# Patient Record
Sex: FEMALE | Race: WHITE | NOT HISPANIC OR LATINO | URBAN - METROPOLITAN AREA
[De-identification: names, ages, dates, MRNs, and addresses within clinical notes are randomized per-mention and may not be internally consistent; named-entity substitution may affect disease eponyms.]

---

## 2022-03-31 ENCOUNTER — OFFICE VISIT (OUTPATIENT)
Dept: UROLOGY | Facility: CLINIC | Age: 62
End: 2022-03-31
Payer: COMMERCIAL

## 2022-03-31 VITALS
SYSTOLIC BLOOD PRESSURE: 131 MMHG | WEIGHT: 158 LBS | HEIGHT: 65 IN | HEART RATE: 91 BPM | DIASTOLIC BLOOD PRESSURE: 80 MMHG | BODY MASS INDEX: 26.33 KG/M2 | TEMPERATURE: 98 F

## 2022-03-31 DIAGNOSIS — N81.10 FEMALE BLADDER PROLAPSE: Primary | ICD-10-CM

## 2022-03-31 PROCEDURE — 1160F PR REVIEW ALL MEDS BY PRESCRIBER/CLIN PHARMACIST DOCUMENTED: ICD-10-PCS | Mod: CPTII,S$GLB,, | Performed by: UROLOGY

## 2022-03-31 PROCEDURE — 3079F PR MOST RECENT DIASTOLIC BLOOD PRESSURE 80-89 MM HG: ICD-10-PCS | Mod: CPTII,S$GLB,, | Performed by: UROLOGY

## 2022-03-31 PROCEDURE — 1160F RVW MEDS BY RX/DR IN RCRD: CPT | Mod: CPTII,S$GLB,, | Performed by: UROLOGY

## 2022-03-31 PROCEDURE — 99205 PR OFFICE/OUTPT VISIT, NEW, LEVL V, 60-74 MIN: ICD-10-PCS | Mod: S$GLB,,, | Performed by: UROLOGY

## 2022-03-31 PROCEDURE — 3079F DIAST BP 80-89 MM HG: CPT | Mod: CPTII,S$GLB,, | Performed by: UROLOGY

## 2022-03-31 PROCEDURE — 3075F SYST BP GE 130 - 139MM HG: CPT | Mod: CPTII,S$GLB,, | Performed by: UROLOGY

## 2022-03-31 PROCEDURE — 1159F MED LIST DOCD IN RCRD: CPT | Mod: CPTII,S$GLB,, | Performed by: UROLOGY

## 2022-03-31 PROCEDURE — 3008F BODY MASS INDEX DOCD: CPT | Mod: CPTII,S$GLB,, | Performed by: UROLOGY

## 2022-03-31 PROCEDURE — 3075F PR MOST RECENT SYSTOLIC BLOOD PRESS GE 130-139MM HG: ICD-10-PCS | Mod: CPTII,S$GLB,, | Performed by: UROLOGY

## 2022-03-31 PROCEDURE — 99205 OFFICE O/P NEW HI 60 MIN: CPT | Mod: S$GLB,,, | Performed by: UROLOGY

## 2022-03-31 PROCEDURE — 3008F PR BODY MASS INDEX (BMI) DOCUMENTED: ICD-10-PCS | Mod: CPTII,S$GLB,, | Performed by: UROLOGY

## 2022-03-31 PROCEDURE — 1159F PR MEDICATION LIST DOCUMENTED IN MEDICAL RECORD: ICD-10-PCS | Mod: CPTII,S$GLB,, | Performed by: UROLOGY

## 2022-03-31 RX ORDER — PHENTERMINE HYDROCHLORIDE 37.5 MG/1
TABLET ORAL
COMMUNITY
Start: 2022-03-07 | End: 2023-11-21

## 2022-03-31 RX ORDER — LEVOTHYROXINE SODIUM 50 UG/1
50 TABLET ORAL DAILY
COMMUNITY
Start: 2022-02-11

## 2022-03-31 RX ORDER — ESOMEPRAZOLE MAGNESIUM 40 MG/1
40 CAPSULE, DELAYED RELEASE ORAL EVERY MORNING
COMMUNITY
Start: 2022-02-11

## 2022-03-31 RX ORDER — TRAZODONE HYDROCHLORIDE 50 MG/1
50 TABLET ORAL DAILY
COMMUNITY
Start: 2022-02-11 | End: 2023-11-21

## 2022-03-31 RX ORDER — DICLOFENAC SODIUM 30 MG/G
GEL TOPICAL
COMMUNITY
Start: 2022-03-09 | End: 2023-11-21

## 2022-03-31 RX ORDER — BUPROPION HYDROCHLORIDE 150 MG/1
150 TABLET, EXTENDED RELEASE ORAL 2 TIMES DAILY
COMMUNITY
Start: 2022-02-11 | End: 2023-11-21

## 2022-03-31 RX ORDER — ESTRADIOL 1 MG/1
1 TABLET ORAL DAILY
COMMUNITY
Start: 2022-03-24

## 2022-03-31 RX ORDER — FESOTERODINE FUMARATE 4 MG/1
4 TABLET, FILM COATED, EXTENDED RELEASE ORAL DAILY
COMMUNITY
Start: 2022-02-11 | End: 2023-11-21 | Stop reason: CLARIF

## 2022-03-31 RX ORDER — LINACLOTIDE 290 UG/1
CAPSULE, GELATIN COATED ORAL
COMMUNITY
Start: 2022-03-14

## 2022-03-31 RX ORDER — FENOFIBRATE 145 MG/1
145 TABLET, FILM COATED ORAL DAILY
COMMUNITY
Start: 2022-03-02

## 2022-03-31 RX ORDER — MELOXICAM 15 MG/1
15 TABLET ORAL DAILY
COMMUNITY
Start: 2022-03-09

## 2022-03-31 RX ORDER — PRAMIPEXOLE DIHYDROCHLORIDE 0.5 MG/1
0.5 TABLET ORAL DAILY
COMMUNITY
Start: 2022-03-02 | End: 2023-11-21

## 2022-03-31 RX ORDER — MONTELUKAST SODIUM 10 MG/1
10 TABLET ORAL DAILY
COMMUNITY
Start: 2022-02-11

## 2022-03-31 NOTE — PROGRESS NOTES
Subjective:       Patient ID: Sandra Rolle is a 62 y.o. female.    Chief Complaint: Bladder Prolapse      HPI:  62-year-old female with classic history for pelvic organ prolapse she has had a hysterectomy, no abdominal operations in the past she does not complain of much stress urinary incontinence.  What she does complain of an obvious bulge protruding from her vagina with standing.    Past Medical History: History reviewed. No pertinent past medical history.    Past Surgical Historical: History reviewed. No pertinent surgical history.     Medications:   Medication List with Changes/Refills   Current Medications    BUPROPION (WELLBUTRIN SR) 150 MG TBSR 12 HR TABLET    Take 150 mg by mouth 2 (two) times daily.    DICLOFENAC SODIUM (SOLARAZE) 3 % GEL    APPLY A SMALL AMOUNT TO AFFECTED AREA FOUR TIMES A DAY    ESOMEPRAZOLE (NEXIUM) 40 MG CAPSULE    Take 40 mg by mouth every morning.    ESTRADIOL (ESTRACE) 1 MG TABLET    Take 1 mg by mouth once daily.    FENOFIBRATE (TRICOR) 145 MG TABLET    Take 145 mg by mouth once daily.    LEVOTHYROXINE (SYNTHROID) 50 MCG TABLET    Take 50 mcg by mouth once daily.    LINZESS 290 MCG CAP CAPSULE    TAKE ONE CAPSULE BY MOUTH DAILY ON EMPTY STOMACH. EAT 1 HR LATER    MELOXICAM (MOBIC) 15 MG TABLET    Take 15 mg by mouth once daily.    MONTELUKAST (SINGULAIR) 10 MG TABLET    Take 10 mg by mouth once daily.    PHENTERMINE (ADIPEX-P) 37.5 MG TABLET    TAKE 1 TABLET BY MOUTH EVERY MORNING BEFORE BREAKFAST FOR 60 DAYS    PRAMIPEXOLE (MIRAPEX) 0.5 MG TABLET    Take 0.5 mg by mouth once daily.    TOVIAZ 4 MG TB24    Take 4 mg by mouth once daily.    TRAZODONE (DESYREL) 50 MG TABLET    Take 50 mg by mouth once daily.        Past Social History:   Social History     Socioeconomic History    Marital status: Unknown   Tobacco Use    Smoking status: Never Smoker    Smokeless tobacco: Never Used   Substance and Sexual Activity    Alcohol use: Not Currently    Drug use: Never    Sexual  activity: Yes       Allergies: Review of patient's allergies indicates:  No Known Allergies     Family History: History reviewed. No pertinent family history.     Review of Systems:  Review of Systems   Constitutional: Negative for activity change and appetite change.   HENT: Negative for congestion and dental problem.    Respiratory: Negative for chest tightness and shortness of breath.    Cardiovascular: Negative for chest pain.   Gastrointestinal: Negative for abdominal distention and abdominal pain.   Genitourinary: Negative for decreased urine volume, difficulty urinating, dyspareunia, dysuria, enuresis, flank pain, frequency, genital sores, hematuria, pelvic pain and urgency.   Musculoskeletal: Negative for back pain and neck pain.   Allergic/Immunologic: Negative for immunocompromised state.   Neurological: Negative for dizziness.   Hematological: Negative for adenopathy.   Psychiatric/Behavioral: Negative for agitation, behavioral problems and confusion.       Physical Exam:  Physical Exam  Constitutional:       Appearance: She is well-developed.   HENT:      Head: Normocephalic and atraumatic.      Right Ear: External ear normal.      Left Ear: External ear normal.   Eyes:      Conjunctiva/sclera: Conjunctivae normal.   Neck:      Vascular: No JVD.   Cardiovascular:      Rate and Rhythm: Normal rate and regular rhythm.   Pulmonary:      Effort: Pulmonary effort is normal. No respiratory distress.      Breath sounds: Normal breath sounds. No wheezing.   Abdominal:      General: There is no distension.      Palpations: Abdomen is soft.      Tenderness: There is no abdominal tenderness. There is no rebound.   Genitourinary:     Comments: On pelvic exam patient has an obvious grade 3-4 cystocele with a grade 2 rectocele  Musculoskeletal:         General: Normal range of motion.      Cervical back: Normal range of motion.   Skin:     General: Skin is warm and dry.      Findings: No erythema or rash.    Neurological:      Mental Status: She is alert and oriented to person, place, and time.   Psychiatric:         Behavior: Behavior normal.         Assessment/Plan:       Problem List Items Addressed This Visit    None     Visit Diagnoses     Female bladder prolapse    -  Primary    Relevant Orders    Ambulatory Referral to External Surgery             Pelvic organ prolapse grade 3-4 cystocele grade 2 rectocele:  We will proceed with robotic sacrocolpopexy and transobturator tape placement next available date

## 2022-03-31 NOTE — PROGRESS NOTES
Pre-op instructions and surgery consent for Robotic sacrocolpopexy with possible sling reviewed with pt. Pt verbalized understanding. Surgery consent signed by pt.

## 2022-04-07 LAB
ANION GAP SERPL CALC-SCNC: 5 MMOL/L (ref 3–11)
APPEARANCE, UA: CLEAR
BANDS: 1 % (ref 0–5)
BILIRUB UR QL STRIP: NEGATIVE MG/DL
BUN SERPL-MCNC: 13 MG/DL (ref 7–18)
BUN/CREAT SERPL: 12.26 RATIO (ref 7–18)
CALCIUM SERPL-MCNC: 9.2 MG/DL (ref 8.8–10.5)
CELLS COUNTED: 100
CHLORIDE SERPL-SCNC: 103 MMOL/L (ref 100–108)
CO2 SERPL-SCNC: 30 MMOL/L (ref 21–32)
COLOR UR: NORMAL
CREAT SERPL-MCNC: 1.06 MG/DL (ref 0.55–1.02)
EOSINOPHIL NFR BLD: 1 % (ref 1–3)
ERYTHROCYTE [DISTWIDTH] IN BLOOD BY AUTOMATED COUNT: 12.8 % (ref 12.5–18)
GFR ESTIMATION: 52
GLUCOSE (UA): NORMAL MG/DL
GLUCOSE SERPL-MCNC: 98 MG/DL (ref 70–110)
HCT VFR BLD AUTO: 45.5 % (ref 37–47)
HGB BLD-MCNC: 14.6 G/DL (ref 12–16)
HGB UR QL STRIP: NEGATIVE /UL
KETONES UR QL STRIP: NEGATIVE MG/DL
LEUKOCYTE ESTERASE UR QL STRIP: NEGATIVE /UL
LYMPHOCYTES NFR BLD: 30 % (ref 25–40)
MCH RBC QN AUTO: 28.6 PG (ref 27–31.2)
MCHC RBC AUTO-ENTMCNC: 32.1 G/DL (ref 31.8–35.4)
MCV RBC AUTO: 89 FL (ref 80–97)
MONOCYTES NFR BLD: 9 % (ref 1–15)
NEUTROPHILS # BLD AUTO: 5 10*3/UL (ref 1.8–7.7)
NEUTROPHILS NFR BLD: 59 % (ref 37–80)
NITRITE UR QL STRIP: NEGATIVE
PH UR STRIP: 6.5 PH (ref 5–9)
PLATELETS: 290 10*3/UL (ref 142–424)
POTASSIUM SERPL-SCNC: 4.2 MMOL/L (ref 3.6–5.2)
PROT UR QL STRIP: NEGATIVE MG/DL
RBC # BLD AUTO: 5.11 10*6/UL (ref 4.2–5.4)
RBC MORPH BLD: NORMAL
SMALL PLATELETS BLD QL SMEAR: ADEQUATE
SODIUM BLD-SCNC: 138 MMOL/L (ref 135–145)
SP GR UR STRIP: 1 (ref 1–1.03)
SPECIMEN COLLECTION METHOD, URINE: NORMAL
UROBILINOGEN UR STRIP-ACNC: NORMAL MG/DL
WBC # BLD: 8.3 10*3/UL (ref 4.6–10.2)

## 2022-04-18 ENCOUNTER — OUTSIDE PLACE OF SERVICE (OUTPATIENT)
Dept: UROLOGY | Facility: CLINIC | Age: 62
End: 2022-04-18
Payer: COMMERCIAL

## 2022-04-18 PROCEDURE — 57425 LAPAROSCOPY SURG COLPOPEXY: CPT | Mod: ,,, | Performed by: UROLOGY

## 2022-04-18 PROCEDURE — 57425 PR LAPAROSCOPY, SURG, COLPOPEXY: ICD-10-PCS | Mod: ,,, | Performed by: UROLOGY

## 2022-05-05 ENCOUNTER — OFFICE VISIT (OUTPATIENT)
Dept: UROLOGY | Facility: CLINIC | Age: 62
End: 2022-05-05
Payer: COMMERCIAL

## 2022-05-05 VITALS
RESPIRATION RATE: 18 BRPM | HEIGHT: 65 IN | HEART RATE: 80 BPM | DIASTOLIC BLOOD PRESSURE: 69 MMHG | SYSTOLIC BLOOD PRESSURE: 130 MMHG | WEIGHT: 156 LBS | TEMPERATURE: 98 F | BODY MASS INDEX: 25.99 KG/M2

## 2022-05-05 DIAGNOSIS — N39.3 STRESS INCONTINENCE: Primary | ICD-10-CM

## 2022-05-05 DIAGNOSIS — N81.10 FEMALE BLADDER PROLAPSE: ICD-10-CM

## 2022-05-05 PROCEDURE — 3008F BODY MASS INDEX DOCD: CPT | Mod: CPTII,S$GLB,, | Performed by: UROLOGY

## 2022-05-05 PROCEDURE — 99024 PR POST-OP FOLLOW-UP VISIT: ICD-10-PCS | Mod: S$GLB,,, | Performed by: UROLOGY

## 2022-05-05 PROCEDURE — 3008F PR BODY MASS INDEX (BMI) DOCUMENTED: ICD-10-PCS | Mod: CPTII,S$GLB,, | Performed by: UROLOGY

## 2022-05-05 PROCEDURE — 3075F PR MOST RECENT SYSTOLIC BLOOD PRESS GE 130-139MM HG: ICD-10-PCS | Mod: CPTII,S$GLB,, | Performed by: UROLOGY

## 2022-05-05 PROCEDURE — 3078F PR MOST RECENT DIASTOLIC BLOOD PRESSURE < 80 MM HG: ICD-10-PCS | Mod: CPTII,S$GLB,, | Performed by: UROLOGY

## 2022-05-05 PROCEDURE — 1160F RVW MEDS BY RX/DR IN RCRD: CPT | Mod: CPTII,S$GLB,, | Performed by: UROLOGY

## 2022-05-05 PROCEDURE — 3078F DIAST BP <80 MM HG: CPT | Mod: CPTII,S$GLB,, | Performed by: UROLOGY

## 2022-05-05 PROCEDURE — 3075F SYST BP GE 130 - 139MM HG: CPT | Mod: CPTII,S$GLB,, | Performed by: UROLOGY

## 2022-05-05 PROCEDURE — 99024 POSTOP FOLLOW-UP VISIT: CPT | Mod: S$GLB,,, | Performed by: UROLOGY

## 2022-05-05 PROCEDURE — 1159F MED LIST DOCD IN RCRD: CPT | Mod: CPTII,S$GLB,, | Performed by: UROLOGY

## 2022-05-05 PROCEDURE — 1160F PR REVIEW ALL MEDS BY PRESCRIBER/CLIN PHARMACIST DOCUMENTED: ICD-10-PCS | Mod: CPTII,S$GLB,, | Performed by: UROLOGY

## 2022-05-05 PROCEDURE — 1159F PR MEDICATION LIST DOCUMENTED IN MEDICAL RECORD: ICD-10-PCS | Mod: CPTII,S$GLB,, | Performed by: UROLOGY

## 2022-05-05 NOTE — PROGRESS NOTES
Two weeks out from a robotic sacrocolpopexy.  Patient is doing well she had full procidentia this is now gone she is very pleased about this however she now has stress urinary incontinence.  I did not place a sling at the time surgery because she has already had a mesh sling several years back and I felt would be best to see how she does without reoperate inguinal a previously explored area with mesh.  Will refer her to pelvic floor rehab see how she would can make some improvements conservatively return to clinic in 3 months

## 2022-08-09 ENCOUNTER — OFFICE VISIT (OUTPATIENT)
Dept: UROLOGY | Facility: CLINIC | Age: 62
End: 2022-08-09
Payer: COMMERCIAL

## 2022-08-09 VITALS
DIASTOLIC BLOOD PRESSURE: 74 MMHG | WEIGHT: 156 LBS | HEIGHT: 65 IN | BODY MASS INDEX: 25.99 KG/M2 | HEART RATE: 90 BPM | SYSTOLIC BLOOD PRESSURE: 126 MMHG

## 2022-08-09 DIAGNOSIS — R39.15 URINARY URGENCY: Primary | ICD-10-CM

## 2022-08-09 DIAGNOSIS — N39.41 URGE URINARY INCONTINENCE: ICD-10-CM

## 2022-08-09 PROCEDURE — 3008F PR BODY MASS INDEX (BMI) DOCUMENTED: ICD-10-PCS | Mod: CPTII,S$GLB,, | Performed by: UROLOGY

## 2022-08-09 PROCEDURE — 3074F SYST BP LT 130 MM HG: CPT | Mod: CPTII,S$GLB,, | Performed by: UROLOGY

## 2022-08-09 PROCEDURE — 99214 OFFICE O/P EST MOD 30 MIN: CPT | Mod: S$GLB,,, | Performed by: UROLOGY

## 2022-08-09 PROCEDURE — 1160F RVW MEDS BY RX/DR IN RCRD: CPT | Mod: CPTII,S$GLB,, | Performed by: UROLOGY

## 2022-08-09 PROCEDURE — 1159F PR MEDICATION LIST DOCUMENTED IN MEDICAL RECORD: ICD-10-PCS | Mod: CPTII,S$GLB,, | Performed by: UROLOGY

## 2022-08-09 PROCEDURE — 3078F DIAST BP <80 MM HG: CPT | Mod: CPTII,S$GLB,, | Performed by: UROLOGY

## 2022-08-09 PROCEDURE — 3078F PR MOST RECENT DIASTOLIC BLOOD PRESSURE < 80 MM HG: ICD-10-PCS | Mod: CPTII,S$GLB,, | Performed by: UROLOGY

## 2022-08-09 PROCEDURE — 99214 PR OFFICE/OUTPT VISIT, EST, LEVL IV, 30-39 MIN: ICD-10-PCS | Mod: S$GLB,,, | Performed by: UROLOGY

## 2022-08-09 PROCEDURE — 1160F PR REVIEW ALL MEDS BY PRESCRIBER/CLIN PHARMACIST DOCUMENTED: ICD-10-PCS | Mod: CPTII,S$GLB,, | Performed by: UROLOGY

## 2022-08-09 PROCEDURE — 1159F MED LIST DOCD IN RCRD: CPT | Mod: CPTII,S$GLB,, | Performed by: UROLOGY

## 2022-08-09 PROCEDURE — 3074F PR MOST RECENT SYSTOLIC BLOOD PRESSURE < 130 MM HG: ICD-10-PCS | Mod: CPTII,S$GLB,, | Performed by: UROLOGY

## 2022-08-09 PROCEDURE — 3008F BODY MASS INDEX DOCD: CPT | Mod: CPTII,S$GLB,, | Performed by: UROLOGY

## 2022-08-09 RX ORDER — ALBUTEROL SULFATE 90 UG/1
2 AEROSOL, METERED RESPIRATORY (INHALATION) EVERY 6 HOURS
COMMUNITY
Start: 2022-05-31

## 2022-08-09 NOTE — PROGRESS NOTES
Subjective:       Patient ID: Sandra Rolle is a 62 y.o. female.    Chief Complaint: Other (3 month post sacro)      HPI:  62-year-old female status post robotic sacrocolpopexy which was successful she had near procidentia and I was very pleased with her lack of pelvic organ prolapse she still does have some urgency and urge incontinence as well as some stress incontinence she had a prior sling placed she has done pelvic floor rehab and has had some improvement but does still have some urgency.  She does admit to drinking significant amounts of liquid    Past Medical History:   Past Medical History:   Diagnosis Date    High cholesterol     Thyroid disease        Past Surgical Historical:   Past Surgical History:   Procedure Laterality Date    BREAST LUMPECTOMY Left     HIP SURGERY Right     HYSTERECTOMY      SACROCOLPOPEXY          Medications:   Medication List with Changes/Refills   Current Medications    ALBUTEROL (PROVENTIL/VENTOLIN HFA) 90 MCG/ACTUATION INHALER    Inhale 2 puffs into the lungs every 6 (six) hours.    BUPROPION (WELLBUTRIN SR) 150 MG TBSR 12 HR TABLET    Take 150 mg by mouth 2 (two) times daily.    DICLOFENAC SODIUM (SOLARAZE) 3 % GEL    APPLY A SMALL AMOUNT TO AFFECTED AREA FOUR TIMES A DAY    ESOMEPRAZOLE (NEXIUM) 40 MG CAPSULE    Take 40 mg by mouth every morning.    ESTRADIOL (ESTRACE) 1 MG TABLET    Take 1 mg by mouth once daily.    FENOFIBRATE (TRICOR) 145 MG TABLET    Take 145 mg by mouth once daily.    LEVOTHYROXINE (SYNTHROID) 50 MCG TABLET    Take 50 mcg by mouth once daily.    LINZESS 290 MCG CAP CAPSULE    TAKE ONE CAPSULE BY MOUTH DAILY ON EMPTY STOMACH. EAT 1 HR LATER    MELOXICAM (MOBIC) 15 MG TABLET    Take 15 mg by mouth once daily.    MONTELUKAST (SINGULAIR) 10 MG TABLET    Take 10 mg by mouth once daily.    PHENTERMINE (ADIPEX-P) 37.5 MG TABLET    TAKE 1 TABLET BY MOUTH EVERY MORNING BEFORE BREAKFAST FOR 60 DAYS    PRAMIPEXOLE (MIRAPEX) 0.5 MG TABLET    Take 0.5 mg by  mouth once daily.    TOVIAZ 4 MG TB24    Take 4 mg by mouth once daily.    TRAZODONE (DESYREL) 50 MG TABLET    Take 50 mg by mouth once daily.        Past Social History:   Social History     Socioeconomic History    Marital status:    Tobacco Use    Smoking status: Never Smoker    Smokeless tobacco: Never Used   Substance and Sexual Activity    Alcohol use: Not Currently    Drug use: Never    Sexual activity: Yes       Allergies: Review of patient's allergies indicates:  No Known Allergies     Family History:   Family History   Problem Relation Age of Onset    Cancer Father     Stomach cancer Father     Cancer Mother     Leukemia Mother         Review of Systems:  Review of Systems   Constitutional: Negative for activity change and appetite change.   HENT: Negative for congestion and dental problem.    Respiratory: Negative for chest tightness and shortness of breath.    Cardiovascular: Negative for chest pain.   Gastrointestinal: Negative for abdominal distention and abdominal pain.   Genitourinary: Negative for decreased urine volume, difficulty urinating, dyspareunia, dysuria, enuresis, flank pain, frequency, genital sores, hematuria, pelvic pain and urgency.   Musculoskeletal: Negative for back pain and neck pain.   Allergic/Immunologic: Negative for immunocompromised state.   Neurological: Negative for dizziness.   Hematological: Negative for adenopathy.   Psychiatric/Behavioral: Negative for agitation, behavioral problems and confusion.       Physical Exam:  Physical Exam  Constitutional:       Appearance: She is well-developed.   HENT:      Head: Normocephalic and atraumatic.      Right Ear: External ear normal.      Left Ear: External ear normal.   Eyes:      Conjunctiva/sclera: Conjunctivae normal.   Neck:      Vascular: No JVD.   Cardiovascular:      Rate and Rhythm: Normal rate and regular rhythm.   Pulmonary:      Effort: Pulmonary effort is normal. No respiratory distress.      Breath  sounds: Normal breath sounds. No wheezing.   Abdominal:      General: There is no distension.      Palpations: Abdomen is soft.      Tenderness: There is no abdominal tenderness. There is no rebound.   Musculoskeletal:         General: Normal range of motion.      Cervical back: Normal range of motion.   Skin:     General: Skin is warm and dry.      Findings: No erythema or rash.   Neurological:      Mental Status: She is alert and oriented to person, place, and time.   Psychiatric:         Behavior: Behavior normal.         Assessment/Plan:       Problem List Items Addressed This Visit    None     Visit Diagnoses     Urinary urgency    -  Primary    Urge urinary incontinence                 Urinary urgency and frequency we will give the patient samples of gem Fior.  I have also instructed her to decrease her fluid intake.  Return to clinic as needed

## 2022-09-14 ENCOUNTER — TELEPHONE (OUTPATIENT)
Dept: UROLOGY | Facility: CLINIC | Age: 62
End: 2022-09-14
Payer: COMMERCIAL

## 2022-09-14 NOTE — TELEPHONE ENCOUNTER
----- Message from Fior Ko MA sent at 9/14/2022  1:34 PM CDT -----  Contact: pt  Pls document that we are giving two weeks myrbetriq  ----- Message -----  From: Nicolas Beckham  Sent: 9/14/2022   9:00 AM CDT  To: Kiley Rodas Staff        Who Called:lilliana   Who Left Message for Patient:  Does the patient know what this is regarding?:rx given to pt   Would the patient rather a call back or a response via [facility]?    Best Call Back Number:.[phone]    Additional Information:

## 2022-09-29 NOTE — TELEPHONE ENCOUNTER
----- Message from Nicolas Beckham sent at 9/29/2022 12:53 PM CDT -----  Contact: pt  .Type:  Patient Returning Call    Who Called:lilliana  Who Left Message for Patient:  Does the patient know what this is regarding?:send rx for samples pt recieved  Would the patient rather a call back or a response via [facility]?   Best Call Back Number:.[phone]    Additional Information: .  [facility]/pharmacy #[id number] - [place], [place] - [address] [address]  Phone: [phone] Fax: [phone]

## 2022-11-18 ENCOUNTER — TELEPHONE (OUTPATIENT)
Dept: UROLOGY | Facility: CLINIC | Age: 62
End: 2022-11-18
Payer: COMMERCIAL

## 2022-11-18 DIAGNOSIS — R39.15 URINARY URGENCY: Primary | ICD-10-CM

## 2022-11-18 RX ORDER — VIBEGRON 75 MG/1
75 TABLET, FILM COATED ORAL DAILY
Qty: 30 TABLET | Refills: 11 | Status: SHIPPED | OUTPATIENT
Start: 2022-11-18 | End: 2024-01-03

## 2022-11-18 NOTE — TELEPHONE ENCOUNTER
Medication refill sent to pharmacy.     ----- Message from Fiorella Brink sent at 11/18/2022 10:43 AM CST -----  Regarding: med refill  Contact: pt  Type:  RX Refill Request    Who Called:  Sandra Rolle  Refill or New Rx: refill   RX Name and Strength: vibegron (GEMTESA) 75 mg Tab  How is the patient currently taking it? (ex. 1XDay): 1x day  Is this a 30 day or 90 day RX: 90 day supply   Preferred Pharmacy with phone number:     [facility]/pharmacy #[id number] - [place], [place] - [address] [address]  Phone: [phone] Fax: [phone]      Local or Mail Order: local   Ordering Provider: Kiley   Would the patient rather a call back or a response via [facility]?  Call back   Best Call Back Number: [phone]  Additional Information:

## 2023-11-21 ENCOUNTER — OFFICE VISIT (OUTPATIENT)
Dept: UROLOGY | Facility: CLINIC | Age: 63
End: 2023-11-21
Payer: MEDICARE

## 2023-11-21 VITALS — BODY MASS INDEX: 25.99 KG/M2 | HEIGHT: 65 IN | TEMPERATURE: 98 F | OXYGEN SATURATION: 98 % | WEIGHT: 156 LBS

## 2023-11-21 DIAGNOSIS — N39.46 MIXED INCONTINENCE URGE AND STRESS: Primary | ICD-10-CM

## 2023-11-21 PROCEDURE — 99215 OFFICE O/P EST HI 40 MIN: CPT | Mod: S$GLB,,, | Performed by: UROLOGY

## 2023-11-21 PROCEDURE — 99215 PR OFFICE/OUTPT VISIT, EST, LEVL V, 40-54 MIN: ICD-10-PCS | Mod: S$GLB,,, | Performed by: UROLOGY

## 2023-11-21 RX ORDER — PRAMIPEXOLE DIHYDROCHLORIDE 0.75 MG/1
0.75 TABLET ORAL
COMMUNITY
Start: 2023-10-09

## 2023-11-21 RX ORDER — DOXAZOSIN 1 MG/1
1 TABLET ORAL
COMMUNITY
Start: 2023-10-18

## 2023-11-21 RX ORDER — NITROGLYCERIN 0.3 MG/1
TABLET SUBLINGUAL
COMMUNITY
Start: 2023-10-05

## 2023-11-21 RX ORDER — TRAZODONE HYDROCHLORIDE 100 MG/1
100 TABLET ORAL
COMMUNITY
Start: 2023-09-25

## 2023-11-21 NOTE — PROGRESS NOTES
Consents and education for Botox 200 units at [facility] on 123/4/23 completed. Pt given highlighted written instructions and copy of pre op orders. Pt states understanding of all verbal and written instructions. Ms Rolle had no questions nor concerns. I advised if she did to just call before or after procedure. Faxed/confirmed pre op orders with medication list to [facility].

## 2023-11-21 NOTE — PROGRESS NOTES
Subjective:       Patient ID: Sandra Rolle is a 63 y.o. female.    Chief Complaint: Urinary Frequency      HPI:  63-year-old female status post robotic sacrocolpopexy which was successful and was very pleased with her lack of pelvic organ prolapse. She still does have some urgency and urge incontinence as well as some stress incontinence. She had a prior sling placed she has done pelvic floor rehab and has had some improvement but does still have some urgency.  She does admit to drinking significant amounts of liquid. She has to change a large pad every hour.     Past Medical History:   Past Medical History:   Diagnosis Date    High cholesterol     Thyroid disease        Past Surgical Historical:   Past Surgical History:   Procedure Laterality Date    BREAST LUMPECTOMY Left     HIP SURGERY Right     HYSTERECTOMY      SACROCOLPOPEXY          Medications:   Medication List with Changes/Refills   Current Medications    ALBUTEROL (PROVENTIL/VENTOLIN HFA) 90 MCG/ACTUATION INHALER    Inhale 2 puffs into the lungs every 6 (six) hours.    BUPROPION (WELLBUTRIN SR) 150 MG TBSR 12 HR TABLET    Take 150 mg by mouth 2 (two) times daily.    DICLOFENAC SODIUM (SOLARAZE) 3 % GEL    APPLY A SMALL AMOUNT TO AFFECTED AREA FOUR TIMES A DAY    ESOMEPRAZOLE (NEXIUM) 40 MG CAPSULE    Take 40 mg by mouth every morning.    ESTRADIOL (ESTRACE) 1 MG TABLET    Take 1 mg by mouth once daily.    FENOFIBRATE (TRICOR) 145 MG TABLET    Take 145 mg by mouth once daily.    LEVOTHYROXINE (SYNTHROID) 50 MCG TABLET    Take 50 mcg by mouth once daily.    LINZESS 290 MCG CAP CAPSULE    TAKE ONE CAPSULE BY MOUTH DAILY ON EMPTY STOMACH. EAT 1 HR LATER    MELOXICAM (MOBIC) 15 MG TABLET    Take 15 mg by mouth once daily.    MONTELUKAST (SINGULAIR) 10 MG TABLET    Take 10 mg by mouth once daily.    PHENTERMINE (ADIPEX-P) 37.5 MG TABLET    TAKE 1 TABLET BY MOUTH EVERY MORNING BEFORE BREAKFAST FOR 60 DAYS    PRAMIPEXOLE (MIRAPEX) 0.5 MG TABLET    Take 0.5  mg by mouth once daily.    TOVIAZ 4 MG TB24    Take 4 mg by mouth once daily.    TRAZODONE (DESYREL) 50 MG TABLET    Take 50 mg by mouth once daily.    VIBEGRON (GEMTESA) 75 MG TAB    Take 75 mg by mouth once daily.        Past Social History:   Social History     Socioeconomic History    Marital status:    Tobacco Use    Smoking status: Never    Smokeless tobacco: Never   Substance and Sexual Activity    Alcohol use: Not Currently    Drug use: Never    Sexual activity: Yes       Allergies: Review of patient's allergies indicates:  No Known Allergies     Family History:   Family History   Problem Relation Age of Onset    Cancer Father     Stomach cancer Father     Cancer Mother     Leukemia Mother         Review of Systems:  Review of Systems   Constitutional:  Negative for activity change and appetite change.   HENT:  Negative for congestion and dental problem.    Respiratory:  Negative for chest tightness and shortness of breath.    Cardiovascular:  Negative for chest pain.   Gastrointestinal:  Negative for abdominal distention and abdominal pain.   Genitourinary:  Negative for decreased urine volume, difficulty urinating, dyspareunia, dysuria, enuresis, flank pain, frequency, genital sores, hematuria, pelvic pain and urgency.   Musculoskeletal:  Negative for back pain and neck pain.   Allergic/Immunologic: Negative for immunocompromised state.   Neurological:  Negative for dizziness.   Hematological:  Negative for adenopathy.   Psychiatric/Behavioral:  Negative for agitation, behavioral problems and confusion.        Physical Exam:  Physical Exam  Constitutional:       Appearance: She is well-developed.   HENT:      Head: Normocephalic and atraumatic.      Right Ear: External ear normal.      Left Ear: External ear normal.   Eyes:      Conjunctiva/sclera: Conjunctivae normal.   Neck:      Vascular: No JVD.   Cardiovascular:      Rate and Rhythm: Normal rate and regular rhythm.   Pulmonary:      Effort:  Pulmonary effort is normal. No respiratory distress.      Breath sounds: Normal breath sounds. No wheezing.   Abdominal:      General: There is no distension.      Palpations: Abdomen is soft.      Tenderness: There is no abdominal tenderness. There is no rebound.   Musculoskeletal:         General: Normal range of motion.      Cervical back: Normal range of motion.   Skin:     General: Skin is warm and dry.      Findings: No erythema or rash.   Neurological:      Mental Status: She is alert and oriented to person, place, and time.   Psychiatric:         Behavior: Behavior normal.         Assessment/Plan:       Urinary urgency and frequency:  Patient has tried Ditropan and Gemtesa in the past with no symptom improvement.  We will set the patient up for 200 units of Botox at the next available date.  Bladder scan was 12 mL postvoid today.    Stress incontinence:  Patient has had a sling in the past.  We will set her up for Botox to see if her symptoms improve.  Patient will be evaluated for possible repeat sling.    I, Dr. Clark Cao have seen and personally evaluated the patient. I have formulated the plan reviewed all pertinent imaging and clinical data.  I agree with the nurse practitioner's assessment, and I have personally formulated the plan for this patient's care as described by the midlevel.  Problem List Items Addressed This Visit    None  Visit Diagnoses       Mixed incontinence urge and stress    -  Primary

## 2023-11-28 LAB
ANION GAP SERPL CALC-SCNC: 9 MMOL/L (ref 3–11)
BASOPHILS NFR BLD: 1.1 % (ref 0–3)
BUN SERPL-MCNC: 18 MG/DL (ref 7–18)
BUN/CREAT SERPL: 20.93 RATIO (ref 7–18)
CALCIUM SERPL-MCNC: 9.5 MG/DL (ref 8.8–10.5)
CHLORIDE SERPL-SCNC: 102 MMOL/L (ref 100–108)
CO2 SERPL-SCNC: 27 MMOL/L (ref 21–32)
CREAT SERPL-MCNC: 0.86 MG/DL (ref 0.55–1.02)
EOSINOPHIL NFR BLD: 2.2 % (ref 1–3)
ERYTHROCYTE [DISTWIDTH] IN BLOOD BY AUTOMATED COUNT: 13.8 % (ref 12.5–18)
GFR ESTIMATION: > 60
GLUCOSE SERPL-MCNC: 92 MG/DL (ref 70–110)
HCT VFR BLD AUTO: 39.5 % (ref 37–47)
HGB BLD-MCNC: 12.9 G/DL (ref 12–16)
LYMPHOCYTES NFR BLD: 41.9 % (ref 25–40)
MCH RBC QN AUTO: 27.1 PG (ref 27–31.2)
MCHC RBC AUTO-ENTMCNC: 32.7 G/DL (ref 31.8–35.4)
MCV RBC AUTO: 83 FL (ref 80–97)
MONOCYTES NFR BLD: 8.2 % (ref 1–15)
NEUTROPHILS # BLD AUTO: 3.46 10*3/UL (ref 1.8–7.7)
NEUTROPHILS NFR BLD: 46.5 % (ref 37–80)
NUCLEATED RED BLOOD CELLS: 0 %
PLATELETS: 282 10*3/UL (ref 142–424)
POTASSIUM SERPL-SCNC: 4.4 MMOL/L (ref 3.6–5.2)
RBC # BLD AUTO: 4.76 10*6/UL (ref 4.2–5.4)
SODIUM BLD-SCNC: 138 MMOL/L (ref 135–145)
WBC # BLD: 7.4 10*3/UL (ref 4.6–10.2)

## 2023-12-01 LAB
APPEARANCE, UA: CLEAR
BILIRUB UR QL STRIP: NEGATIVE MG/DL
COLOR UR: NORMAL
GLUCOSE (UA): NORMAL MG/DL
HGB UR QL STRIP: NEGATIVE /UL
KETONES UR QL STRIP: NEGATIVE MG/DL
LEUKOCYTE ESTERASE UR QL STRIP: NEGATIVE /UL
NITRITE UR QL STRIP: NEGATIVE
PH UR STRIP: 5 PH (ref 5–9)
PROT UR QL STRIP: NEGATIVE MG/DL
SP GR UR STRIP: 1.01 (ref 1–1.03)
SPECIMEN COLLECTION METHOD, URINE: NORMAL
UROBILINOGEN UR STRIP-ACNC: NORMAL MG/DL

## 2023-12-04 ENCOUNTER — OUTSIDE PLACE OF SERVICE (OUTPATIENT)
Dept: UROLOGY | Facility: CLINIC | Age: 63
End: 2023-12-04

## 2023-12-04 DIAGNOSIS — R39.15 URINARY URGENCY: Primary | ICD-10-CM

## 2023-12-04 PROCEDURE — 52287 CYSTOSCOPY CHEMODENERVATION: CPT | Mod: ,,, | Performed by: UROLOGY

## 2023-12-04 PROCEDURE — 52287 PR CYSTOURETHROSCOPY WITH INJ FOR CHEMODENERVATION: ICD-10-PCS | Mod: ,,, | Performed by: UROLOGY

## 2023-12-04 RX ORDER — HYDROCODONE BITARTRATE AND ACETAMINOPHEN 7.5; 325 MG/1; MG/1
1 TABLET ORAL EVERY 6 HOURS PRN
Qty: 15 TABLET | Refills: 0 | Status: SHIPPED | OUTPATIENT
Start: 2023-12-04

## 2023-12-04 RX ORDER — CIPROFLOXACIN 500 MG/1
500 TABLET ORAL EVERY 12 HOURS
Qty: 6 TABLET | Refills: 0 | Status: SHIPPED | OUTPATIENT
Start: 2023-12-04 | End: 2023-12-07

## 2023-12-14 ENCOUNTER — TELEPHONE (OUTPATIENT)
Dept: UROLOGY | Facility: CLINIC | Age: 63
End: 2023-12-14
Payer: MEDICARE

## 2023-12-14 NOTE — TELEPHONE ENCOUNTER
Called spoke to pt, she stated that she wanted a work in apt for tomorrow to make sure she wasn't having an allergic reaction to the Botox, she is breaking out in the vaginal area, with red whelps and having pain and burning. Advised pt to stop wearing the scented pads and keep dry and lean and come in tomorrow morning to see the NP Thaddeus. Pt was in agreement      ----- Message from Lynette Hess sent at 12/14/2023  1:23 PM CST -----  Contact: self  Type:  Patient Returning Call    Who Called:Sandra Rolle  Who Left Message for Patient:unsure  Does the patient know what this is regarding?:post op questions  Would the patient rather a call back or a response via [facility]?   Best Call Back Number:[phone]  Additional Information:   [facility]/pharmacy #[id number] - [place], [place] - [address] [address]  Phone: [phone] Fax: [phone]

## 2023-12-15 ENCOUNTER — OFFICE VISIT (OUTPATIENT)
Dept: UROLOGY | Facility: CLINIC | Age: 63
End: 2023-12-15
Payer: MEDICARE

## 2023-12-15 VITALS
DIASTOLIC BLOOD PRESSURE: 81 MMHG | OXYGEN SATURATION: 98 % | WEIGHT: 156 LBS | HEIGHT: 65 IN | TEMPERATURE: 98 F | SYSTOLIC BLOOD PRESSURE: 158 MMHG | BODY MASS INDEX: 25.99 KG/M2 | HEART RATE: 68 BPM

## 2023-12-15 DIAGNOSIS — L25.9 CONTACT DERMATITIS, UNSPECIFIED CONTACT DERMATITIS TYPE, UNSPECIFIED TRIGGER: Primary | ICD-10-CM

## 2023-12-15 DIAGNOSIS — R30.0 DYSURIA: ICD-10-CM

## 2023-12-15 LAB
BILIRUBIN, UA POC OHS: NEGATIVE
BLOOD, UA POC OHS: NEGATIVE
CLARITY, UA POC OHS: CLEAR
COLOR, UA POC OHS: YELLOW
GLUCOSE, UA POC OHS: NEGATIVE
KETONES, UA POC OHS: NEGATIVE
LEUKOCYTES, UA POC OHS: ABNORMAL
NITRITE, UA POC OHS: NEGATIVE
PH, UA POC OHS: 6
PROTEIN, UA POC OHS: NEGATIVE
SPECIFIC GRAVITY, UA POC OHS: 1.02
UROBILINOGEN, UA POC OHS: 0.2

## 2023-12-15 PROCEDURE — 99214 PR OFFICE/OUTPT VISIT, EST, LEVL IV, 30-39 MIN: ICD-10-PCS | Mod: S$GLB,,, | Performed by: FAMILY MEDICINE

## 2023-12-15 PROCEDURE — 81003 URINALYSIS AUTO W/O SCOPE: CPT | Mod: QW,S$GLB,, | Performed by: FAMILY MEDICINE

## 2023-12-15 PROCEDURE — 99214 OFFICE O/P EST MOD 30 MIN: CPT | Mod: S$GLB,,, | Performed by: FAMILY MEDICINE

## 2023-12-15 PROCEDURE — 81003 POCT URINALYSIS(INSTRUMENT): ICD-10-PCS | Mod: QW,S$GLB,, | Performed by: FAMILY MEDICINE

## 2023-12-15 RX ORDER — CLOTRIMAZOLE AND BETAMETHASONE DIPROPIONATE 10; .64 MG/G; MG/G
1 CREAM TOPICAL 2 TIMES DAILY
Qty: 45 G | Refills: 1 | Status: SHIPPED | OUTPATIENT
Start: 2023-12-15 | End: 2023-12-25

## 2023-12-20 NOTE — PROGRESS NOTES
Subjective:       Patient ID: Sandra Rolle is a 63 y.o. female.    Chief Complaint: Urinary Tract Infection      HPI: 63-year-old female patient following up after recent Botox.  Patient received Botox injections for urinary urgency and frequency 2 weeks ago.  She developed a rash on her external genitalia 5 days ago.  She complains of redness itching and burning.  Patient states that she changed her sanitary pads and believes it may be due to this but wanted to be evaluated due to recent Botox.         Past Medical History:   Past Medical History:   Diagnosis Date    Asthma     High cholesterol     S/P Botox injection     Thyroid disease        Past Surgical Historical:   Past Surgical History:   Procedure Laterality Date    BREAST LUMPECTOMY Left     HIP SURGERY Right     HYSTERECTOMY      SACROCOLPOPEXY          Medications:   Medication List with Changes/Refills   New Medications    CLOTRIMAZOLE-BETAMETHASONE 1-0.05% (LOTRISONE) CREAM    Apply 1 g topically 2 (two) times daily. for 10 days   Current Medications    ALBUTEROL (PROVENTIL/VENTOLIN HFA) 90 MCG/ACTUATION INHALER    Inhale 2 puffs into the lungs every 6 (six) hours.    DOXAZOSIN (CARDURA) 1 MG TABLET    Take 1 mg by mouth.    ESOMEPRAZOLE (NEXIUM) 40 MG CAPSULE    Take 40 mg by mouth every morning.    ESTRADIOL (ESTRACE) 1 MG TABLET    Take 1 mg by mouth once daily.    FENOFIBRATE (TRICOR) 145 MG TABLET    Take 145 mg by mouth once daily.    HYDROCODONE-ACETAMINOPHEN (NORCO) 7.5-325 MG PER TABLET    Take 1 tablet by mouth every 6 (six) hours as needed for Pain.    LEVOTHYROXINE (SYNTHROID) 50 MCG TABLET    Take 50 mcg by mouth once daily.    LINZESS 290 MCG CAP CAPSULE    TAKE ONE CAPSULE BY MOUTH DAILY ON EMPTY STOMACH. EAT 1 HR LATER    MELOXICAM (MOBIC) 15 MG TABLET    Take 15 mg by mouth once daily.    MONTELUKAST (SINGULAIR) 10 MG TABLET    Take 10 mg by mouth once daily.    NITROGLYCERIN (NITROSTAT) 0.3 MG SL TABLET    DISSOLVE 1 TABLET UNDER  TONGUE EVERY 5 MINUTES FOR SEVERE ESOPHAGEAL PAIN FOR UP TO 3 DOSES    PRAMIPEXOLE (MIRAPEX) 0.75 MG TABLET    Take 0.75 mg by mouth.    TRAZODONE (DESYREL) 100 MG TABLET    Take 100 mg by mouth.    VIBEGRON (GEMTESA) 75 MG TAB    Take 75 mg by mouth once daily.        Past Social History:   Social History     Socioeconomic History    Marital status:    Tobacco Use    Smoking status: Never    Smokeless tobacco: Never   Substance and Sexual Activity    Alcohol use: Not Currently    Drug use: Never    Sexual activity: Yes       Allergies: Review of patient's allergies indicates:  No Known Allergies     Family History:   Family History   Problem Relation Age of Onset    Cancer Father     Stomach cancer Father     Cancer Mother     Leukemia Mother         Review of Systems:  Review of Systems   Constitutional:  Negative for activity change, appetite change, chills, diaphoresis, fatigue, fever and unexpected weight change.   HENT:  Negative for congestion, dental problem, drooling, ear discharge, ear pain, facial swelling, hearing loss, mouth sores, nosebleeds, postnasal drip, rhinorrhea, sinus pressure, sinus pain, sneezing, sore throat, tinnitus, trouble swallowing and voice change.    Eyes:  Negative for photophobia, pain, discharge, redness, itching and visual disturbance.   Respiratory:  Negative for apnea, cough, choking, chest tightness, shortness of breath, wheezing and stridor.    Cardiovascular:  Negative for chest pain and leg swelling.   Gastrointestinal:  Negative for abdominal distention, abdominal pain, anal bleeding, blood in stool, constipation, diarrhea, nausea, rectal pain and vomiting.   Endocrine: Negative for cold intolerance, heat intolerance, polydipsia, polyphagia and polyuria.   Genitourinary:  Positive for vaginal pain. Negative for decreased urine volume, difficulty urinating, dyspareunia, dysuria, enuresis, flank pain, frequency, genital sores, hematuria, menstrual problem, pelvic  pain, urgency, vaginal bleeding and vaginal discharge.   Musculoskeletal:  Negative for arthralgias, back pain, gait problem, joint swelling, myalgias, neck pain and neck stiffness.   Skin:  Negative for color change, pallor, rash and wound.   Allergic/Immunologic: Negative for environmental allergies, food allergies and immunocompromised state.   Neurological:  Negative for dizziness, tremors, seizures, syncope, facial asymmetry, speech difficulty, weakness, light-headedness, numbness and headaches.   Hematological:  Negative for adenopathy. Does not bruise/bleed easily.   Psychiatric/Behavioral:  Negative for agitation, behavioral problems, confusion, decreased concentration, dysphoric mood, hallucinations, self-injury, sleep disturbance and suicidal ideas. The patient is not nervous/anxious and is not hyperactive.        Physical Exam:  Physical Exam  Exam conducted with a chaperone present.   Constitutional:       Appearance: Normal appearance.   HENT:      Head: Normocephalic.      Nose: Nose normal.      Mouth/Throat:      Mouth: Mucous membranes are moist.      Pharynx: Oropharynx is clear.   Eyes:      Extraocular Movements: Extraocular movements intact.      Conjunctiva/sclera: Conjunctivae normal.      Pupils: Pupils are equal, round, and reactive to light.   Cardiovascular:      Rate and Rhythm: Normal rate and regular rhythm.      Pulses: Normal pulses.      Heart sounds: Normal heart sounds.   Pulmonary:      Effort: Pulmonary effort is normal.      Breath sounds: Normal breath sounds.   Abdominal:      General: Abdomen is flat. Bowel sounds are normal.      Palpations: Abdomen is soft.      Hernia: There is no hernia in the left inguinal area or right inguinal area.   Genitourinary:     General: Normal vulva.      Pubic Area: No rash or pubic lice.       Labia:         Right: No rash, tenderness, lesion or injury.         Left: No rash, tenderness, lesion or injury.       Urethra: No prolapse, urethral  pain, urethral swelling or urethral lesion.      Rectum: Normal.   Musculoskeletal:         General: Normal range of motion.      Cervical back: Normal range of motion and neck supple.   Lymphadenopathy:      Lower Body: No right inguinal adenopathy. No left inguinal adenopathy.   Skin:     General: Skin is warm and dry.      Capillary Refill: Capillary refill takes less than 2 seconds.   Neurological:      General: No focal deficit present.      Mental Status: She is alert and oriented to person, place, and time. Mental status is at baseline.   Psychiatric:         Mood and Affect: Mood normal.         Behavior: Behavior normal.         Thought Content: Thought content normal.         Judgment: Judgment normal.         Assessment/Plan:     Patient appears to have some type of contact dermatitis associated with recent scented sanitary pad.  Instructed patient to avoid using these in the future.  Topical steroid since her pharmacy.  Urinalysis negative for infection.  Patient denies any burning at the urethra.  She does complain of some discomfort at the vaginal opening where obvious rashes.  Patient can keep her Botox follow up appointment to be re-evaluated.  Problem List Items Addressed This Visit    None  Visit Diagnoses       Dysuria    -  Primary    Relevant Orders    POCT Urinalysis(Instrument) (Completed)

## 2023-12-29 ENCOUNTER — OFFICE VISIT (OUTPATIENT)
Dept: UROLOGY | Facility: CLINIC | Age: 63
End: 2023-12-29
Payer: MEDICARE

## 2023-12-29 VITALS — WEIGHT: 156 LBS | HEIGHT: 65 IN | BODY MASS INDEX: 25.99 KG/M2

## 2023-12-29 DIAGNOSIS — N39.41 URGE URINARY INCONTINENCE: Primary | ICD-10-CM

## 2023-12-29 PROCEDURE — 99024 PR POST-OP FOLLOW-UP VISIT: ICD-10-PCS | Mod: S$GLB,POP,, | Performed by: FAMILY MEDICINE

## 2023-12-29 PROCEDURE — 99024 POSTOP FOLLOW-UP VISIT: CPT | Mod: S$GLB,POP,, | Performed by: FAMILY MEDICINE

## 2023-12-29 NOTE — PROGRESS NOTES
Subjective:       Patient ID: Sandra Rolle is a 63 y.o. female.    Chief Complaint: No chief complaint on file.      HPI: 63-year-old female patient presenting to the clinic to follow up post Botox.  Patient doing well has no complaints at this time.  Urinary leakage has improved         Past Medical History:   Past Medical History:   Diagnosis Date    Asthma     High cholesterol     S/P Botox injection     Thyroid disease        Past Surgical Historical:   Past Surgical History:   Procedure Laterality Date    BLADDER AUGMENTATION      BREAST LUMPECTOMY Left     HIP SURGERY Right     HYSTERECTOMY      SACROCOLPOPEXY          Medications:   Medication List with Changes/Refills   Current Medications    ALBUTEROL (PROVENTIL/VENTOLIN HFA) 90 MCG/ACTUATION INHALER    Inhale 2 puffs into the lungs every 6 (six) hours.    DOXAZOSIN (CARDURA) 1 MG TABLET    Take 1 mg by mouth.    ESOMEPRAZOLE (NEXIUM) 40 MG CAPSULE    Take 40 mg by mouth every morning.    ESTRADIOL (ESTRACE) 1 MG TABLET    Take 1 mg by mouth once daily.    FENOFIBRATE (TRICOR) 145 MG TABLET    Take 145 mg by mouth once daily.    HYDROCODONE-ACETAMINOPHEN (NORCO) 7.5-325 MG PER TABLET    Take 1 tablet by mouth every 6 (six) hours as needed for Pain.    LEVOTHYROXINE (SYNTHROID) 50 MCG TABLET    Take 50 mcg by mouth once daily.    LINZESS 290 MCG CAP CAPSULE    TAKE ONE CAPSULE BY MOUTH DAILY ON EMPTY STOMACH. EAT 1 HR LATER    MELOXICAM (MOBIC) 15 MG TABLET    Take 15 mg by mouth once daily.    MONTELUKAST (SINGULAIR) 10 MG TABLET    Take 10 mg by mouth once daily.    NITROGLYCERIN (NITROSTAT) 0.3 MG SL TABLET    DISSOLVE 1 TABLET UNDER TONGUE EVERY 5 MINUTES FOR SEVERE ESOPHAGEAL PAIN FOR UP TO 3 DOSES    PRAMIPEXOLE (MIRAPEX) 0.75 MG TABLET    Take 0.75 mg by mouth.    TRAZODONE (DESYREL) 100 MG TABLET    Take 100 mg by mouth.    VIBEGRON (GEMTESA) 75 MG TAB    Take 75 mg by mouth once daily.        Past Social History:   Social History      Socioeconomic History    Marital status:    Tobacco Use    Smoking status: Never    Smokeless tobacco: Never   Substance and Sexual Activity    Alcohol use: Not Currently    Drug use: Never    Sexual activity: Yes       Allergies: Review of patient's allergies indicates:  No Known Allergies     Family History:   Family History   Problem Relation Age of Onset    Cancer Father     Stomach cancer Father     Cancer Mother     Leukemia Mother         Review of Systems:  Review of Systems   Constitutional:  Negative for activity change, appetite change, chills, diaphoresis, fatigue, fever and unexpected weight change.   HENT:  Negative for congestion, dental problem, drooling, ear discharge, ear pain, facial swelling, hearing loss, mouth sores, nosebleeds, postnasal drip, rhinorrhea, sinus pressure, sinus pain, sneezing, sore throat, tinnitus, trouble swallowing and voice change.    Eyes:  Negative for photophobia, pain, discharge, redness, itching and visual disturbance.   Respiratory:  Negative for apnea, cough, choking, chest tightness, shortness of breath, wheezing and stridor.    Cardiovascular:  Negative for chest pain and leg swelling.   Gastrointestinal:  Negative for abdominal distention, abdominal pain, anal bleeding, blood in stool, constipation, diarrhea, nausea, rectal pain and vomiting.   Endocrine: Negative for cold intolerance, heat intolerance, polydipsia, polyphagia and polyuria.   Genitourinary: Negative.  Negative for decreased urine volume, difficulty urinating, dyspareunia, dysuria, enuresis, flank pain, frequency, genital sores, hematuria, menstrual problem, pelvic pain, urgency, vaginal bleeding, vaginal discharge and vaginal pain.   Musculoskeletal:  Negative for arthralgias, back pain, gait problem, joint swelling, myalgias, neck pain and neck stiffness.   Skin:  Negative for color change, pallor, rash and wound.   Allergic/Immunologic: Negative for environmental allergies, food  allergies and immunocompromised state.   Neurological:  Negative for dizziness, tremors, seizures, syncope, facial asymmetry, speech difficulty, weakness, light-headedness, numbness and headaches.   Hematological:  Negative for adenopathy. Does not bruise/bleed easily.   Psychiatric/Behavioral:  Negative for agitation, behavioral problems, confusion, decreased concentration, dysphoric mood, hallucinations, self-injury, sleep disturbance and suicidal ideas. The patient is not nervous/anxious and is not hyperactive.        Physical Exam:  Physical Exam  Exam conducted with a chaperone present.   Constitutional:       Appearance: Normal appearance.   HENT:      Head: Normocephalic.      Nose: Nose normal.      Mouth/Throat:      Mouth: Mucous membranes are moist.      Pharynx: Oropharynx is clear.   Eyes:      Extraocular Movements: Extraocular movements intact.      Conjunctiva/sclera: Conjunctivae normal.      Pupils: Pupils are equal, round, and reactive to light.   Cardiovascular:      Rate and Rhythm: Normal rate and regular rhythm.      Pulses: Normal pulses.      Heart sounds: Normal heart sounds.   Pulmonary:      Effort: Pulmonary effort is normal.      Breath sounds: Normal breath sounds.   Abdominal:      General: Abdomen is flat. Bowel sounds are normal.      Palpations: Abdomen is soft.      Hernia: There is no hernia in the left inguinal area or right inguinal area.   Genitourinary:     General: Normal vulva.      Pubic Area: No rash or pubic lice.       Labia:         Right: No rash, tenderness, lesion or injury.         Left: No rash, tenderness, lesion or injury.       Urethra: No prolapse, urethral pain, urethral swelling or urethral lesion.      Rectum: Normal.   Musculoskeletal:         General: Normal range of motion.      Cervical back: Normal range of motion and neck supple.   Lymphadenopathy:      Lower Body: No right inguinal adenopathy. No left inguinal adenopathy.   Skin:     General: Skin is  warm and dry.      Capillary Refill: Capillary refill takes less than 2 seconds.   Neurological:      General: No focal deficit present.      Mental Status: She is alert and oriented to person, place, and time. Mental status is at baseline.   Psychiatric:         Mood and Affect: Mood normal.         Behavior: Behavior normal.         Thought Content: Thought content normal.         Judgment: Judgment normal.         Assessment/Plan:     Postop Botox:  Patient doing well and happy with results.  PVR today 9 mL  Problem List Items Addressed This Visit    None

## 2024-01-03 DIAGNOSIS — R39.15 URINARY URGENCY: ICD-10-CM

## 2024-01-03 RX ORDER — VIBEGRON 75 MG/1
1 TABLET, FILM COATED ORAL
Qty: 90 TABLET | Refills: 3 | Status: SHIPPED | OUTPATIENT
Start: 2024-01-03

## 2025-01-08 DIAGNOSIS — R39.15 URINARY URGENCY: ICD-10-CM

## 2025-01-08 RX ORDER — VIBEGRON 75 MG/1
1 TABLET, FILM COATED ORAL
Qty: 90 TABLET | Refills: 0 | Status: SHIPPED | OUTPATIENT
Start: 2025-01-08

## 2025-04-16 DIAGNOSIS — R39.15 URINARY URGENCY: ICD-10-CM

## 2025-04-17 RX ORDER — VIBEGRON 75 MG/1
1 TABLET, FILM COATED ORAL
Qty: 90 TABLET | Refills: 0 | Status: SHIPPED | OUTPATIENT
Start: 2025-04-17

## 2025-08-23 ENCOUNTER — PATIENT MESSAGE (OUTPATIENT)
Dept: RESEARCH | Facility: HOSPITAL | Age: 65
End: 2025-08-23
Payer: MEDICARE